# Patient Record
Sex: FEMALE | Race: BLACK OR AFRICAN AMERICAN | URBAN - METROPOLITAN AREA
[De-identification: names, ages, dates, MRNs, and addresses within clinical notes are randomized per-mention and may not be internally consistent; named-entity substitution may affect disease eponyms.]

---

## 2023-08-31 ENCOUNTER — APPOINTMENT (OUTPATIENT)
Dept: URBAN - METROPOLITAN AREA CLINIC 232 | Age: 68
Setting detail: DERMATOLOGY
End: 2023-08-31

## 2023-08-31 DIAGNOSIS — M34.83 SYSTEMIC SCLEROSIS WITH POLYNEUROPATHY: ICD-10-CM

## 2023-08-31 PROCEDURE — OTHER PRESCRIPTION: OTHER

## 2023-08-31 PROCEDURE — OTHER PATIENT SPECIFIC COUNSELING: OTHER

## 2023-08-31 PROCEDURE — 99204 OFFICE O/P NEW MOD 45 MIN: CPT

## 2023-08-31 PROCEDURE — OTHER MIPS QUALITY: OTHER

## 2023-08-31 PROCEDURE — OTHER COUNSELING: OTHER

## 2023-08-31 RX ORDER — CLOBETASOL PROPIONATE 0.5 MG/G
CREAM TOPICAL
Qty: 60 | Refills: 0 | Status: ERX | COMMUNITY
Start: 2023-08-31

## 2023-08-31 NOTE — HPI: SKIN LESION
What Type Of Note Output Would You Prefer (Optional)?: Standard Output
How Severe Is Your Skin Lesion?: mild
Has Your Skin Lesion Been Treated?: not been treated
Is This A New Presentation, Or A Follow-Up?: Skin Lesion
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 Pt ophthalmologist thinks pt condition is correlated with her eye problems.

## 2023-08-31 NOTE — PROCEDURE: MIPS QUALITY
Quality 226: Preventive Care And Screening: Tobacco Use: Screening And Cessation Intervention: Patient screened for tobacco use and is an ex/non-smoker
Detail Level: Detailed
Quality 402: Tobacco Use And Help With Quitting Among Adolescents: Patient screened for tobacco and is an ex-smoker
Quality 47: Advance Care Plan: Advance Care Planning discussed and documented in the medical record; patient did not wish or was not able to name a surrogate decision maker or provide an advance care plan.

## 2023-08-31 NOTE — PROCEDURE: PATIENT SPECIFIC COUNSELING
Detail Level: Zone
The area involved appears to be \"burnt out\" by this point. The pt notes she has 1st degree relatives with RA and lupus. Though her presentation would be consistent with localized scleroderma (morphea), I am concerned that the symptoms in the arm are caused by deeper involvement at this time, which an MRI may help to delineate. I would like to see the results of prior imaging, and perhaps have the radiologist take a focused look at the involved area to further elucidate the cause of her symptoms. I do feel it would be reasonable to treat from the outside with clobetasol. Systemic options would be quite limited given her advanced stage lung cancer. I recommed we obtain an [facility]ER[facility]O & NATALIE panel at this time to determine what autoantibodies are present, if any, which may help to attribute or eliminate her visual findings as related to the scleroderma. 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